# Patient Record
Sex: FEMALE | Race: BLACK OR AFRICAN AMERICAN | Employment: UNEMPLOYED | URBAN - METROPOLITAN AREA
[De-identification: names, ages, dates, MRNs, and addresses within clinical notes are randomized per-mention and may not be internally consistent; named-entity substitution may affect disease eponyms.]

---

## 2019-03-31 ENCOUNTER — HOSPITAL ENCOUNTER (EMERGENCY)
Age: 34
Discharge: HOME | End: 2019-03-31
Attending: EMERGENCY MEDICINE
Payer: MEDICARE

## 2019-03-31 VITALS
TEMPERATURE: 97.5 F | BODY MASS INDEX: 33.65 KG/M2 | WEIGHT: 197.09 LBS | HEART RATE: 79 BPM | DIASTOLIC BLOOD PRESSURE: 73 MMHG | OXYGEN SATURATION: 100 % | SYSTOLIC BLOOD PRESSURE: 127 MMHG | RESPIRATION RATE: 18 BRPM | HEIGHT: 64 IN

## 2019-03-31 DIAGNOSIS — S09.93XA DENTAL INJURY, INITIAL ENCOUNTER: Primary | ICD-10-CM

## 2019-03-31 LAB
APPEARANCE UR: ABNORMAL
BACTERIA URNS QL MICRO: NEGATIVE /HPF
BILIRUB UR QL CFM: NEGATIVE
COLOR UR: ABNORMAL
EPITH CASTS URNS QL MICRO: ABNORMAL /LPF
GLUCOSE UR STRIP.AUTO-MCNC: NEGATIVE MG/DL
HCG UR QL: NEGATIVE
HGB UR QL STRIP: NEGATIVE
KETONES UR QL STRIP.AUTO: ABNORMAL MG/DL
LEUKOCYTE ESTERASE UR QL STRIP.AUTO: ABNORMAL
NITRITE UR QL STRIP.AUTO: NEGATIVE
PH UR STRIP: 7 [PH] (ref 5–8)
PROT UR STRIP-MCNC: ABNORMAL MG/DL
RBC #/AREA URNS HPF: ABNORMAL /HPF (ref 0–5)
SP GR UR REFRACTOMETRY: 1.03 (ref 1–1.03)
UA: UC IF INDICATED,UAUC: ABNORMAL
UROBILINOGEN UR QL STRIP.AUTO: 1 EU/DL (ref 0.2–1)
WBC URNS QL MICRO: ABNORMAL /HPF (ref 0–4)

## 2019-03-31 PROCEDURE — 81025 URINE PREGNANCY TEST: CPT

## 2019-03-31 PROCEDURE — 81001 URINALYSIS AUTO W/SCOPE: CPT

## 2019-03-31 PROCEDURE — 87086 URINE CULTURE/COLONY COUNT: CPT

## 2019-03-31 PROCEDURE — 99283 EMERGENCY DEPT VISIT LOW MDM: CPT

## 2019-03-31 RX ORDER — HYDROCODONE BITARTRATE AND ACETAMINOPHEN 5; 300 MG/1; MG/1
1 TABLET ORAL
Qty: 12 TAB | Refills: 0 | Status: SHIPPED | OUTPATIENT
Start: 2019-03-31 | End: 2019-04-03

## 2019-03-31 RX ORDER — PENICILLIN V POTASSIUM 500 MG/1
500 TABLET, FILM COATED ORAL 4 TIMES DAILY
Qty: 40 TAB | Refills: 0 | Status: SHIPPED | OUTPATIENT
Start: 2019-03-31 | End: 2021-03-27

## 2019-03-31 NOTE — ED NOTES
Assumed care of pt from triage. Pt reports she fell at a party Friday night. States she hit her head but denies LOC. Patient reports her L upper tooth broke off and fell out, and also c/o L lower back pain. Pt in position of comfort with no signs of acute distress.

## 2019-03-31 NOTE — DISCHARGE INSTRUCTIONS
Patient Education     Dental Pain: After Your Visit  Your Care Instructions  The most common cause of dental pain is tooth decay. It can also be caused by an infection of the tooth (abscess) or gum, a tooth that has not broken all the way through the gum (impacted tooth), or a problem with the nerve-filled center of the tooth. Follow-up care is a key part of your treatment and safety. Be sure to make and go to all appointments, and call your doctor if you are having problems. Its also a good idea to know your test results and keep a list of the medicines you take. How can you care for yourself at home? · Contact a dentist for follow-up care. · Put ice or a cold pack on the outside of your mouth for 10 to 20 minutes at a time to reduce pain and swelling. Put a thin cloth between the ice and your skin. · Take an over-the-counter pain medicine, such as acetaminophen (Tylenol), ibuprofen (Advil, Motrin), or naproxen (Aleve). Read and follow all instructions on the label. · Do not take two or more pain medicines at the same time unless the doctor told you to. Many pain medicines have acetaminophen, which is Tylenol. Too much acetaminophen (Tylenol) can be harmful. · Rinse your mouth with warm salt water every 2 hours to help relieve pain and swelling from an infected tooth. Mix 1 teaspoon of salt in 8 ounces of water. · If your doctor prescribed antibiotics, take them as directed. Do not stop taking them just because you feel better. You need to take the full course of antibiotics. When should you call for help? Call your doctor now or seek immediate medical care if:  · You have signs of infection, such as:  ¨ Increased pain, swelling, warmth, or redness. ¨ Pus draining from the gum, tooth, or face. ¨ A fever. Watch closely for changes in your health, and be sure to contact your doctor if:  · You do not get better as expected. Where can you learn more?    Go to DealExplorer.be  Enter V264 in the search box to learn more about \"Dental Pain: After Your Visit. \"   © 3040-3457 Healthwise, Incorporated. Care instructions adapted under license by [facility] (which disclaims liability or warranty for this information). This care instruction is for use with your licensed healthcare professional. If you have questions about a medical condition or this instruction, always ask your healthcare professional. [place] [address] any warranty or liability for your use of this information. Content Version: 37.3.011742; Last Revised: May 17, 2013        [facility] Departments     For adult and child immunizations, family planning, TB screening, STD testing and women's health services. [place]: [place] [phone]      [address]: [facility] 2700 [facility] [phone]      2400 [facility]          [address] [place] [address]     For primary care services, woman and child wellness, and some clinics providing specialty care. [facility] -- [address] [facility]. [address] [place] [address] [phone]   [address] [place] [address] 200 [facility] [address] [place] [address] [phone]   [facility] [address] [place] [address] [phone]   [address]  [place] [address] [place] [address] 5850 [facility]  [phone]   [facility] [address] [place] [phone]   [address] [phone]   [facility] [address] [place] [address] [phone]   [facility]: [facility] 700 [facility] [phone]     [place] [phone]   [facility] 5850 [facility]  [phone]   [facility]  [address], 5[address] [facility] [place] ([email or web address]) [phone]         Sexual Health/Woman Wellness Clinics    For STD/HIV testing and treatment, pregnancy testing and services, men's health, birth control services, LGBT services, and hepatitis/HPV vaccine services. [place] [facility] [place] for [place] [employer] [address]. [facility] [address] [facility] 1579 [address] [place] [phone]   [facility] [address], 5th floor [phone]   Pregnancy [address] [facility] [address]  [place] [phone] 68[address] [facility] [address] [place] [address]   [phone]   [place]   [phone]   Women, Infant and Children's Services: [address] [phone]       600 [facility]   [phone] 4800 [facility]   [phone] 6125 [facility]     [phone]   [address] Crisis   1212 [facility] [phone]     Local Primary Care Physicians  64 [facility] [phone]  MD Mike Rodriges MD Balinda Ovens, MD [facility] [phone]  Jagjit Anthony [facility]  MD Miriam Farmer MD Robie Grout, MD [address] [place] [address] [phone]  MD Tika Kaufman MD [address] [facility] [phone]  MD Nidhi Alegria MD Valorie Constable, MD Leita Snide, MD   [facility] [phone]  CIWV BCNYVQ TAMMIE, MD Alicia Chaudhary, NP [address] [facility] [address] [phone]  Carola Grego, MD Raj Hunt, MD Franki Bing, MD Lanney Gift, MD Letha Short, MD Michaela Saint, MD Bartolo Ran, MD   [place] [address] Leonard Colindres MD [facility] [phone]  MD Riya Hernandez Wilfrid Myers, NP  Maximino Boogie, MD aHzel Espino, MD Brian Zelaya, MD Pee Reed, MD Munira Amaya, MD   5342 [facility] [phone]  Geovanni Greenwood, MD Lo King, FNP  Genetta Mouse, NP  Jone Leslie, MD Tamanna Roach, MD Niya De, MD Odessa Patricio, MD [facility] [phone]  Fausto Langston, MD Shanna Phillips, MD Natalie Dumas, MD Antonio Baron, MD Patricia Gonzalez, MD   [facility] [phone]  Jeff Trejo, MD Sher Olivier, MD [place] [phone]  Thea Nowak, MD [place], MD Cher Montilla, MD   [facility] [phone]  Paco Rodriguez, MD Jay Castillo, MD Roland Ferro, MD Keerthi Padron, MD Jonathan Lozada, MD Cadence Perez, BELINDA Cuevas MD 1619 [facility]   [phone]  Elder Johnson, MD Andreina Gr, MD Leonora Navarro MD   [address] [facility] [phone]  Kyle Vizcaino, MD Jaymie Roberto, P  Phil Frank, PA-C  Phil Frank, FNP  Kat Denson, PAMaria LC  Aneudy Walker, MD Jack Ghosh, NP   Jacquie Valentin, DO Miscellaneous:  [place] [phone]

## 2019-03-31 NOTE — ED NOTES
Dr. Yara Sam reviewed discharge instructions with the patient. The patient verbalized understanding. All questions and concerns were addressed. The patient declined a wheelchair and is discharged ambulatory in the care of family members with instructions and prescriptions in hand. Pt is alert and oriented x 4. Respirations are clear and unlabored.

## 2019-04-01 NOTE — ED PROVIDER NOTES
EMERGENCY DEPARTMENT HISTORY AND PHYSICAL EXAM 
 
 
Date: 3/31/2019 Patient Name: Ernst Alfaro History of Presenting Illness Chief Complaint Patient presents with  Fall  
  complains of ground level fall  yesterday: complains of dental pain and left lower back pain History Provided By: Patient HPI: Ernst Alfaro, 35 y.o. female with PMHx significant no past medical history, presents to the ED with cc of moderate left facial pain and left shoulder pain after a fall 3 days ago. Patient reports chipping tooth and left upper jaw at the time and is now having significant pain with any oral intake. She reports having poor dental hygiene symptoms have been well controlled until this fall. Patient shoulder pain has improved and she still has active full range of motion of the arm. She believed her arm is simply bruised. She has no other associated symptoms. She denies any exacerbating or ameliorating factors. There are no other complaints, changes, or physical findings at this time. PCP: Unknown, Provider No current facility-administered medications on file prior to encounter. No current outpatient medications on file prior to encounter. Past History Past Medical History: 
History reviewed. No pertinent past medical history. Past Surgical History: 
Past Surgical History:  
Procedure Laterality Date  HX CHOLECYSTECTOMY  HX TUBAL LIGATION Family History: 
History reviewed. No pertinent family history. Social History: 
Social History Tobacco Use  Smoking status: Current Every Day Smoker Packs/day: 1.00  Smokeless tobacco: Never Used Substance Use Topics  Alcohol use: Yes  Drug use: Yes Types: Marijuana Allergies: Allergies Allergen Reactions  Morphine Hives Review of Systems Review of Systems Constitutional: Negative for chills, diaphoresis, fatigue and fever. HENT: Negative for ear pain and sore throat. Eyes: Negative for pain and redness. Respiratory: Negative for cough and shortness of breath. Cardiovascular: Negative for chest pain and leg swelling. Gastrointestinal: Negative for abdominal pain, diarrhea, nausea and vomiting. Endocrine: Negative for cold intolerance and heat intolerance. Genitourinary: Negative for flank pain and hematuria. Musculoskeletal: Negative for back pain and neck stiffness. Skin: Negative for rash and wound. Neurological: Negative for dizziness, syncope and headaches. All other systems reviewed and are negative. Physical Exam  
Physical Exam  
Constitutional: She is oriented to person, place, and time. She appears well-developed and well-nourished. HENT:  
Head: Normocephalic and atraumatic. Mouth/Throat: Oropharynx is clear and moist. No oropharyngeal exudate. Patient with Ali Bonine type II fracture to the left upper first premolar. No bleeding from the site. No surrounding periapical abscess. No C-spine tenderness and full active range of motion of the neck without pain. Eyes: Pupils are equal, round, and reactive to light. Conjunctivae and EOM are normal.  
Neck: Normal range of motion. Cardiovascular: Normal rate and regular rhythm. No murmur heard. Pulmonary/Chest: Effort normal and breath sounds normal. No respiratory distress. She has no wheezes. Abdominal: Soft. Bowel sounds are normal. She exhibits no distension. There is no tenderness. Musculoskeletal: Normal range of motion. She exhibits no edema or deformity. Patient with mild tenderness to palpation over the left proximal humerus. There is no significant bony tenderness. She has full active range of motion of both the left shoulder and the left elbow. Neurological: She is alert and oriented to person, place, and time. Coordination normal.  
Skin: Skin is warm and dry. No rash noted. Psychiatric: She has a normal mood and affect. Her behavior is normal.  
Nursing note and vitals reviewed. Diagnostic Study Results Labs - Recent Results (from the past 24 hour(s)) URINALYSIS W/ REFLEX CULTURE Collection Time: 03/31/19  2:41 PM  
Result Value Ref Range Color DARK YELLOW Appearance CLOUDY (A) CLEAR Specific gravity 1.030 1.003 - 1.030    
 pH (UA) 7.0 5.0 - 8.0 Protein TRACE (A) NEG mg/dL Glucose NEGATIVE  NEG mg/dL Ketone TRACE (A) NEG mg/dL Blood NEGATIVE  NEG Urobilinogen 1.0 0.2 - 1.0 EU/dL Nitrites NEGATIVE  NEG Leukocyte Esterase MODERATE (A) NEG    
 WBC 10-20 0 - 4 /hpf  
 RBC 0-5 0 - 5 /hpf Epithelial cells MODERATE (A) FEW /lpf Bacteria NEGATIVE  NEG /hpf  
 UA:UC IF INDICATED URINE CULTURE ORDERED (A) CNI    
HCG URINE, QL Collection Time: 03/31/19  2:41 PM  
Result Value Ref Range HCG urine, QL NEGATIVE  NEG    
BILIRUBIN, CONFIRM Collection Time: 03/31/19  2:41 PM  
Result Value Ref Range Bilirubin UA, confirm NEGATIVE  NEG Radiologic Studies - No orders to display CT Results  (Last 48 hours) None CXR Results  (Last 48 hours) None Medical Decision Making I am the first provider for this patient. I reviewed the vital signs, available nursing notes, past medical history, past surgical history, family history and social history. Vital Signs-Reviewed the patient's vital signs. Patient Vitals for the past 24 hrs: 
 Temp Pulse Resp BP SpO2  
03/31/19 1434 97.5 °F (36.4 °C) 79 18 127/73 100 % Records Reviewed: Nursing Notes Differential Diagnosis: 
 
Dental abscess versus dental fracture. Provider Notes (Medical Decision Making):  
Patient with type II Ochoa fracture to left upper premolar. No indication for emergent dental intervention.   We will treat symptomatically and give course of antibiotics given the possibility of underlying periapical abscess. No evidence of fracture of the forearm. She will follow-up with her dentist and her primary care doctor for further management. ED Course:  
 
Initial assessment performed. The patients presenting problems have been discussed, and they are in agreement with the care plan formulated and outlined with them. I have encouraged them to ask questions as they arise throughout their visit. Critical Care Time:  
 
None Disposition: 
10:41 PM 
Krista Freitas  results have been reviewed with her. She has been counseled regarding her diagnosis. She verbally conveys understanding and agreement of the signs, symptoms, diagnosis, treatment and prognosis and additionally agrees to follow up as recommended with Dr. Hannah Gutiérrez, Provider in 24 - 48 hours. She also agrees with the care-plan and conveys that all of her questions have been answered. I have also put together some discharge instructions for her that include: 1) educational information regarding their diagnosis, 2) how to care for their diagnosis at home, as well a 3) list of reasons why they would want to return to the ED prior to their follow-up appointment, should their condition change. PLAN: 
1. Discharge Medication List as of 3/31/2019  4:31 PM  
  
START taking these medications Details HYDROcodone-acetaminophen (VICODIN) 5-300 mg tablet Take 1 Tab by mouth every six (6) hours as needed for Pain for up to 3 days. Max Daily Amount: 4 Tabs., Print, Disp-12 Tab, R-0  
  
penicillin v potassium (VEETID) 500 mg tablet Take 1 Tab by mouth four (4) times daily. , Print, Disp-40 Tab, R-0  
  
  
 
2. Follow-up Information Follow up With Specialties Details Why Contact Info See Provided List  
  
 
Return to ED if worse Diagnosis Clinical Impression: 1. Dental injury, initial encounter

## 2019-04-02 LAB
BACTERIA SPEC CULT: NORMAL
CC UR VC: NORMAL
SERVICE CMNT-IMP: NORMAL

## 2021-03-27 ENCOUNTER — OFFICE VISIT (OUTPATIENT)
Dept: URGENT CARE | Age: 36
End: 2021-03-27
Payer: MEDICARE

## 2021-03-27 DIAGNOSIS — Z20.822 EXPOSURE TO COVID-19 VIRUS: Primary | ICD-10-CM

## 2021-03-27 PROCEDURE — 99202 OFFICE O/P NEW SF 15 MIN: CPT | Performed by: FAMILY MEDICINE

## 2021-03-27 PROCEDURE — G8432 DEP SCR NOT DOC, RNG: HCPCS | Performed by: FAMILY MEDICINE

## 2021-03-27 PROCEDURE — G8421 BMI NOT CALCULATED: HCPCS | Performed by: FAMILY MEDICINE

## 2021-03-27 PROCEDURE — G8427 DOCREV CUR MEDS BY ELIG CLIN: HCPCS | Performed by: FAMILY MEDICINE

## 2021-03-27 NOTE — PROGRESS NOTES
This patient was seen at [address] [place] [address] Urgent Care while in their vehicle due to COVID-19 pandemic with PPE and focused examination in order to decrease community viral transmission. The patient/guardian gave verbal consent to treat. The history is provided by the patient. Exposed to covid from family member    C/o fatigue    History reviewed. No pertinent past medical history. Past Surgical History:   Procedure Laterality Date    HX CHOLECYSTECTOMY      HX TUBAL LIGATION           History reviewed. No pertinent family history. Social History     Socioeconomic History    Marital status: SINGLE     Spouse name: Not on file    Number of children: Not on file    Years of education: Not on file    Highest education level: Not on file   Occupational History    Not on file   Social Needs    Financial resource strain: Not on file    Food insecurity     Worry: Not on file     Inability: Not on file    Transportation needs     Medical: Not on file     Non-medical: Not on file   Tobacco Use    Smoking status: Current Every Day Smoker     Packs/day: 1.00    Smokeless tobacco: Never Used   Substance and Sexual Activity    Alcohol use:  Yes    Drug use: Yes     Types: Marijuana    Sexual activity: Yes     Partners: Male     Birth control/protection: Injection   Lifestyle    Physical activity     Days per week: Not on file     Minutes per session: Not on file    Stress: Not on file   Relationships    Social connections     Talks on phone: Not on file     Gets together: Not on file     Attends Mosque service: Not on file     Active member of club or organization: Not on file     Attends meetings of clubs or organizations: Not on file     Relationship status: Not on file    Intimate partner violence     Fear of current or ex partner: Not on file     Emotionally abused: Not on file     Physically abused: Not on file     Forced sexual activity: Not on file   Other Topics Concern    Not on file   Social History Narrative    Not on file                ALLERGIES: Morphine    Review of Systems   All other systems reviewed and are negative. There were no vitals filed for this visit. Physical Exam  Vitals signs and nursing note reviewed. Constitutional:       General: She is not in acute distress. Appearance: She is not ill-appearing. Pulmonary:      Effort: Pulmonary effort is normal. No respiratory distress. Breath sounds: No wheezing. MDM    Procedures        ICD-10-CM ICD-9-CM    1. Exposure to COVID-19 virus  Z20.822 V01.79 NOVEL CORONAVIRUS (COVID-19)     No orders of the defined types were placed in this encounter. No results found for any visits on 03/27/21. The patients condition was discussed with the patient and they understand. The patient is to follow up with primary care doctor. If signs and symptoms become worse the pt is to go to the ER. The patient is to take medications as prescribed.

## 2021-03-28 LAB
SARS-COV-2, NAA 2 DAY TAT: NORMAL
SARS-COV-2, NAA: NOT DETECTED

## 2022-12-21 ENCOUNTER — HOSPITAL ENCOUNTER (EMERGENCY)
Age: 37
Discharge: HOME | End: 2022-12-22
Attending: STUDENT IN AN ORGANIZED HEALTH CARE EDUCATION/TRAINING PROGRAM
Payer: MEDICARE

## 2022-12-21 ENCOUNTER — APPOINTMENT (OUTPATIENT)
Dept: GENERAL RADIOLOGY | Age: 37
End: 2022-12-21
Attending: STUDENT IN AN ORGANIZED HEALTH CARE EDUCATION/TRAINING PROGRAM
Payer: MEDICARE

## 2022-12-21 VITALS
BODY MASS INDEX: 29.92 KG/M2 | SYSTOLIC BLOOD PRESSURE: 129 MMHG | DIASTOLIC BLOOD PRESSURE: 68 MMHG | HEIGHT: 64 IN | OXYGEN SATURATION: 100 % | WEIGHT: 175.27 LBS | RESPIRATION RATE: 18 BRPM | HEART RATE: 87 BPM | TEMPERATURE: 98.6 F

## 2022-12-21 DIAGNOSIS — G56.32 RADIAL NERVE PALSY, LEFT: Primary | ICD-10-CM

## 2022-12-21 LAB — HCG UR QL: NEGATIVE

## 2022-12-21 PROCEDURE — 73130 X-RAY EXAM OF HAND: CPT

## 2022-12-21 PROCEDURE — 99283 EMERGENCY DEPT VISIT LOW MDM: CPT

## 2022-12-21 PROCEDURE — 81025 URINE PREGNANCY TEST: CPT

## 2022-12-22 NOTE — PROGRESS NOTES
Verbal shift change report given to KARL Lay (oncoming nurse) by Radha Horne (offgoing nurse). Report included the following information SBAR, Kardex, ED Summary, and MAR.

## 2022-12-22 NOTE — ED TRIAGE NOTES
Emergency Room Nursing Note        Patient Name: Selin Valenzuela      : 1985             MRN: [id number]      Chief Complaint:  Hand Pain      Admit Diagnosis: No admission diagnoses are documented for this encounter. Admitting Provider: No admitting provider for patient encounter. Surgery: * No surgery found *           Patient arrived to the ER ambulatory from home with complaints of Left Hand Pain that started 1 month ago, patient able to wiggle digits, has sensation and good pulses. No trauma or fall disclosed.          Lines:        Signed by: Jayna Alfaro RN, ANASTACIA, BSN, VIA [facility]                                              2022 at 9:59 PM

## 2022-12-22 NOTE — ED PROVIDER NOTES
70-year-old female with no significant past medical history presents to the ED with chief complaint of right wrist weakness and right hand numbness for the past 2 weeks. Patient is unsure exactly how symptoms started, says she was relapsing on drugs and is not sure. Patient says that overall her wrist weakness has improved but is still preventing her from doing normal things. She does not do anything for work. No known injuries. No fevers, chills, chest pain, difficulty breathing, abdominal pain, urinary symptoms, bowel symptoms. Has not been seen previously for this problem. The history is provided by the patient. Hand Pain   Associated symptoms include numbness (see HPI). Pertinent negatives include no back pain and no neck pain. No past medical history on file. Past Surgical History:   Procedure Laterality Date    HX CHOLECYSTECTOMY      HX TUBAL LIGATION           No family history on file. Social History     Socioeconomic History    Marital status: SINGLE     Spouse name: Not on file    Number of children: Not on file    Years of education: Not on file    Highest education level: Not on file   Occupational History    Not on file   Tobacco Use    Smoking status: Every Day     Packs/day: 1.00     Types: Cigarettes    Smokeless tobacco: Never   Substance and Sexual Activity    Alcohol use: Yes    Drug use: Yes     Types: Marijuana    Sexual activity: Yes     Partners: Male     Birth control/protection: Injection   Other Topics Concern    Not on file   Social History Narrative    Not on file     Social Determinants of Health     Financial Resource Strain: Not on file   Food Insecurity: Not on file   Transportation Needs: Not on file   Physical Activity: Not on file   Stress: Not on file   Social Connections: Not on file   Intimate Partner Violence: Not on file   Housing Stability: Not on file         ALLERGIES: Morphine    Review of Systems   Constitutional:  Negative for chills and fever. HENT:  Negative for congestion and rhinorrhea. Respiratory:  Negative for cough and shortness of breath. Cardiovascular:  Negative for chest pain and leg swelling. Gastrointestinal:  Negative for abdominal pain, constipation, diarrhea, nausea and vomiting. Genitourinary:  Negative for difficulty urinating, dysuria and hematuria. Musculoskeletal:  Negative for back pain and neck pain. Skin:  Negative for color change and rash. Neurological:  Positive for weakness (see HPI) and numbness (see HPI). Negative for dizziness, light-headedness and headaches. Psychiatric/Behavioral:  Negative for agitation and confusion. Vitals:    12/21/22 2156   BP: 129/68   Pulse: 87   Resp: 18   Temp: 98.6 °F (37 °C)   SpO2: 100%   Weight: 79.5 kg (175 lb 4.3 oz)   Height: 5' 4\" (1.626 m)            Physical Exam  Constitutional:       General: She is not in acute distress. Appearance: She is well-developed. HENT:      Head: Normocephalic and atraumatic. Eyes:      General: No scleral icterus. Pupils: Pupils are equal, round, and reactive to light. Neck:      Trachea: No tracheal deviation. Cardiovascular:      Rate and Rhythm: Normal rate and regular rhythm. Heart sounds: No murmur heard. No friction rub. No gallop. Pulmonary:      Effort: Pulmonary effort is normal. No respiratory distress. Breath sounds: Normal breath sounds. No wheezing or rales. Abdominal:      General: Bowel sounds are normal. There is no distension. Palpations: Abdomen is soft. Tenderness: There is no abdominal tenderness. Musculoskeletal:         General: No deformity. Cervical back: Neck supple. Skin:     General: Skin is warm and dry. Neurological:      Mental Status: She is alert and oriented to person, place, and time. Comments: +R wrist drop, limited movement of fingers, diminished sensation to 3rd-5th fingers. Elbow with full strength.    Psychiatric:         Behavior: Behavior normal.        MDM  Number of Diagnoses or Management Options  Radial nerve palsy, left  Diagnosis management comments: 49-year-old female presenting to the ED with right arm weakness, exam with right wrist drop and diminished sensation to the third through fifth fingers. Most consistent with radial nerve palsy. X-ray without any acute issues. Will place splint and have her follow-up with neurology and orthopedics. Return precautions given. No concern for stroke based on history or exam.       Amount and/or Complexity of Data Reviewed  Tests in the radiology section of CPT®: ordered and reviewed           Procedures    DISCHARGE NOTE:  The patient has been re-evaluated and feeling much better and are stable for discharge. All available radiology and laboratory results have been reviewed with patient and/or available family. Patient and/or family verbally conveyed their understanding and agreement of the patient's signs, symptoms, diagnosis, treatment and prognosis and additionally agree to follow-up as recommended in the discharge instructions or to return to the Emergency Department should their condition change or worsen prior to their follow-up appointment. All questions have been answered and patient and/or available family express understanding. LABORATORY RESULTS:  Recent Results (from the past 24 hour(s))   HCG URINE, QL. - POC    Collection Time: 12/21/22 10:49 PM   Result Value Ref Range    Pregnancy test,urine (POC) Negative NEG         IMAGING RESULTS:  XR HAND LT MIN 3 V    Result Date: 12/21/2022  No acute abnormality. MEDICATIONS GIVEN:  Medications - No data to display    IMPRESSION:  1.  Radial nerve palsy, left        PLAN:  Follow-up Information       Follow up With Specialties Details Why [facility]  In 3 days  [address]  In 3 days  [address]  [phone]          There are no discharge medications for this patient.       Signed By: Desiree Gay MD     December 22, 2022

## 2024-02-27 NOTE — ED NOTES
Wrist splint applied and patient taught how to reapply. Able to redemonstrate without difficulty.
Follow Instructions Provided by your Surgical Team